# Patient Record
Sex: MALE | Race: BLACK OR AFRICAN AMERICAN | NOT HISPANIC OR LATINO | URBAN - METROPOLITAN AREA
[De-identification: names, ages, dates, MRNs, and addresses within clinical notes are randomized per-mention and may not be internally consistent; named-entity substitution may affect disease eponyms.]

---

## 2018-04-06 ENCOUNTER — INPATIENT (INPATIENT)
Facility: HOSPITAL | Age: 28
LOS: 0 days | Discharge: HOME | DRG: 343 | End: 2018-04-07
Attending: SURGERY | Admitting: SURGERY
Payer: COMMERCIAL

## 2018-04-06 VITALS
DIASTOLIC BLOOD PRESSURE: 73 MMHG | TEMPERATURE: 98 F | HEIGHT: 67 IN | OXYGEN SATURATION: 97 % | RESPIRATION RATE: 16 BRPM | WEIGHT: 167.99 LBS | HEART RATE: 82 BPM | SYSTOLIC BLOOD PRESSURE: 120 MMHG

## 2018-04-06 DIAGNOSIS — K35.80 UNSPECIFIED ACUTE APPENDICITIS: ICD-10-CM

## 2018-04-06 DIAGNOSIS — R10.9 UNSPECIFIED ABDOMINAL PAIN: ICD-10-CM

## 2018-04-06 LAB
ALBUMIN SERPL ELPH-MCNC: 4.8 G/DL — SIGNIFICANT CHANGE UP (ref 3.3–5)
ALP SERPL-CCNC: 47 U/L — SIGNIFICANT CHANGE UP (ref 40–120)
ALT FLD-CCNC: 23 U/L — SIGNIFICANT CHANGE UP (ref 10–45)
ANION GAP SERPL CALC-SCNC: 16 MMOL/L — SIGNIFICANT CHANGE UP (ref 5–17)
APTT BLD: 27 SEC — LOW (ref 27.5–37.4)
AST SERPL-CCNC: 21 U/L — SIGNIFICANT CHANGE UP (ref 10–40)
BILIRUB SERPL-MCNC: 2.3 MG/DL — HIGH (ref 0.2–1.2)
BLD GP AB SCN SERPL QL: NEGATIVE — SIGNIFICANT CHANGE UP
BUN SERPL-MCNC: 16 MG/DL — SIGNIFICANT CHANGE UP (ref 7–23)
CALCIUM SERPL-MCNC: 9.3 MG/DL — SIGNIFICANT CHANGE UP (ref 8.4–10.5)
CHLORIDE SERPL-SCNC: 95 MMOL/L — LOW (ref 96–108)
CO2 SERPL-SCNC: 22 MMOL/L — SIGNIFICANT CHANGE UP (ref 22–31)
CREAT SERPL-MCNC: 0.84 MG/DL — SIGNIFICANT CHANGE UP (ref 0.5–1.3)
EXTRA BLUE TOP TUBE: SIGNIFICANT CHANGE UP
GLUCOSE SERPL-MCNC: 129 MG/DL — HIGH (ref 70–99)
HCT VFR BLD CALC: 42 % — SIGNIFICANT CHANGE UP (ref 39–50)
HGB BLD-MCNC: 14.1 G/DL — SIGNIFICANT CHANGE UP (ref 13–17)
INR BLD: 1.31 — HIGH (ref 0.88–1.16)
LIDOCAIN IGE QN: 22 U/L — SIGNIFICANT CHANGE UP (ref 7–60)
LYMPHOCYTES # BLD AUTO: 6 % — LOW (ref 13–44)
MCHC RBC-ENTMCNC: 31.2 PG — SIGNIFICANT CHANGE UP (ref 27–34)
MCHC RBC-ENTMCNC: 33.6 G/DL — SIGNIFICANT CHANGE UP (ref 32–36)
MCV RBC AUTO: 92.9 FL — SIGNIFICANT CHANGE UP (ref 80–100)
MONOCYTES NFR BLD AUTO: 11 % — SIGNIFICANT CHANGE UP (ref 2–14)
NEUTROPHILS NFR BLD AUTO: 83 % — HIGH (ref 43–77)
PLATELET # BLD AUTO: 198 K/UL — SIGNIFICANT CHANGE UP (ref 150–400)
POTASSIUM SERPL-MCNC: 3.8 MMOL/L — SIGNIFICANT CHANGE UP (ref 3.5–5.3)
POTASSIUM SERPL-SCNC: 3.8 MMOL/L — SIGNIFICANT CHANGE UP (ref 3.5–5.3)
PROT SERPL-MCNC: 8.6 G/DL — HIGH (ref 6–8.3)
PROTHROM AB SERPL-ACNC: 14.6 SEC — HIGH (ref 9.8–12.7)
RBC # BLD: 4.52 M/UL — SIGNIFICANT CHANGE UP (ref 4.2–5.8)
RBC # FLD: 12.3 % — SIGNIFICANT CHANGE UP (ref 10.3–16.9)
RH IG SCN BLD-IMP: POSITIVE — SIGNIFICANT CHANGE UP
SODIUM SERPL-SCNC: 133 MMOL/L — LOW (ref 135–145)
WBC # BLD: 18.3 K/UL — HIGH (ref 3.8–10.5)
WBC # FLD AUTO: 18.3 K/UL — HIGH (ref 3.8–10.5)

## 2018-04-06 PROCEDURE — 99285 EMERGENCY DEPT VISIT HI MDM: CPT

## 2018-04-06 PROCEDURE — 74177 CT ABD & PELVIS W/CONTRAST: CPT | Mod: 26

## 2018-04-06 RX ORDER — OXYCODONE AND ACETAMINOPHEN 5; 325 MG/1; MG/1
1 TABLET ORAL EVERY 4 HOURS
Qty: 0 | Refills: 0 | Status: DISCONTINUED | OUTPATIENT
Start: 2018-04-06 | End: 2018-04-07

## 2018-04-06 RX ORDER — PIPERACILLIN AND TAZOBACTAM 4; .5 G/20ML; G/20ML
3.38 INJECTION, POWDER, LYOPHILIZED, FOR SOLUTION INTRAVENOUS EVERY 6 HOURS
Qty: 0 | Refills: 0 | Status: DISCONTINUED | OUTPATIENT
Start: 2018-04-06 | End: 2018-04-06

## 2018-04-06 RX ORDER — OXYCODONE AND ACETAMINOPHEN 5; 325 MG/1; MG/1
2 TABLET ORAL EVERY 4 HOURS
Qty: 0 | Refills: 0 | Status: DISCONTINUED | OUTPATIENT
Start: 2018-04-06 | End: 2018-04-07

## 2018-04-06 RX ORDER — CEFOXITIN 1 G/1
2 INJECTION, POWDER, FOR SOLUTION INTRAVENOUS ONCE
Qty: 0 | Refills: 0 | Status: COMPLETED | OUTPATIENT
Start: 2018-04-06 | End: 2018-04-06

## 2018-04-06 RX ORDER — HYDROMORPHONE HYDROCHLORIDE 2 MG/ML
0.5 INJECTION INTRAMUSCULAR; INTRAVENOUS; SUBCUTANEOUS EVERY 4 HOURS
Qty: 0 | Refills: 0 | Status: DISCONTINUED | OUTPATIENT
Start: 2018-04-06 | End: 2018-04-06

## 2018-04-06 RX ORDER — SODIUM CHLORIDE 9 MG/ML
1000 INJECTION, SOLUTION INTRAVENOUS
Qty: 0 | Refills: 0 | Status: DISCONTINUED | OUTPATIENT
Start: 2018-04-06 | End: 2018-04-07

## 2018-04-06 RX ORDER — ONDANSETRON 8 MG/1
4 TABLET, FILM COATED ORAL EVERY 6 HOURS
Qty: 0 | Refills: 0 | Status: DISCONTINUED | OUTPATIENT
Start: 2018-04-06 | End: 2018-04-07

## 2018-04-06 RX ORDER — CEFOTETAN DISODIUM 1 G
2 VIAL (EA) INJECTION ONCE
Qty: 0 | Refills: 0 | Status: DISCONTINUED | OUTPATIENT
Start: 2018-04-06 | End: 2018-04-06

## 2018-04-06 RX ORDER — HYDROMORPHONE HYDROCHLORIDE 2 MG/ML
1 INJECTION INTRAMUSCULAR; INTRAVENOUS; SUBCUTANEOUS EVERY 4 HOURS
Qty: 0 | Refills: 0 | Status: DISCONTINUED | OUTPATIENT
Start: 2018-04-06 | End: 2018-04-06

## 2018-04-06 RX ORDER — SODIUM CHLORIDE 9 MG/ML
1000 INJECTION INTRAMUSCULAR; INTRAVENOUS; SUBCUTANEOUS ONCE
Qty: 0 | Refills: 0 | Status: COMPLETED | OUTPATIENT
Start: 2018-04-06 | End: 2018-04-06

## 2018-04-06 RX ORDER — IOHEXOL 300 MG/ML
50 INJECTION, SOLUTION INTRAVENOUS ONCE
Qty: 0 | Refills: 0 | Status: COMPLETED | OUTPATIENT
Start: 2018-04-06 | End: 2018-04-06

## 2018-04-06 RX ADMIN — CEFOXITIN 100 GRAM(S): 1 INJECTION, POWDER, FOR SOLUTION INTRAVENOUS at 13:08

## 2018-04-06 RX ADMIN — IOHEXOL 50 MILLILITER(S): 300 INJECTION, SOLUTION INTRAVENOUS at 11:00

## 2018-04-06 RX ADMIN — SODIUM CHLORIDE 1000 MILLILITER(S): 9 INJECTION INTRAMUSCULAR; INTRAVENOUS; SUBCUTANEOUS at 11:04

## 2018-04-06 NOTE — H&P ADULT - ASSESSMENT
27yoM otherwise healthy presents to the ED with 1-day of abdominal pain and emesis likely due to acute appendicitis. non     Admit to General Surgery Team 4/ACS regional  NPO  IVF  IV cefoxitin  SCDs  OOBA/IS  Added on to OR schedule for today 27yoM otherwise healthy presents to the ED with 1-day of abdominal pain and emesis likely due to acute appendicitis, non perforated    Admit to General Surgery Team 4/ACS regional  NPO  IVF  IV cefoxitin  SCDs  OOBA/IS  Added on to OR schedule for today  Discussed with chief and attending

## 2018-04-06 NOTE — ED PROVIDER NOTE - OBJECTIVE STATEMENT
28 y/o m no pmh presents stating he has been having mid abdominal pain for the past day.  Pt stating pain has been constant, sharp.  Pt reporting nausea, no vomiting.  Went to [facility] this morning and was sent to ED to rule out appendicitis.  Pt denies diarrhea, fever, dysuria, all other ROS negative.

## 2018-04-06 NOTE — ED ADULT TRIAGE NOTE - ARRIVAL INFO ADDITIONAL COMMENTS
"I was sent from [facility] MD for a rule out appendicitis." Patient with periumbilical pain since yesterday that began acutely. No nausea. Denies any trauma. No heavy lifting. Last BM was yesterday. States normal. Denies any dysuria.

## 2018-04-06 NOTE — H&P ADULT - NSHPLABSRESULTS_GEN_ALL_CORE
14.1   18.3  )-----------( 198      ( 06 Apr 2018 10:11 )             42.0   04-06    133<L>  |  95<L>  |  16  ----------------------------<  129<H>  3.8   |  22  |  0.84    Ca    9.3      06 Apr 2018 10:11    TPro  8.6<H>  /  Alb  4.8  /  TBili  2.3<H>  /  DBili  x   /  AST  21  /  ALT  23  /  AlkPhos  47  04-06  < from: CT Abdomen and Pelvis w/ Oral Cont and w/ IV Cont (04.06.18 @ 12:14) >    There are the few scattered too small to characterize hypodensities   throughout the liver and bilateral kidneys. The gallbladder is   underdistended. The liver, spleen, pancreas, adrenal glands and both   kidneys are otherwise unremarkable in appearance. There is no   organomegaly.    No abdominal aortic aneurysm is seen. No retroperitoneal mass or   lymphadenopathy is seen.     The appendix is dilated and fluid-filled with hyperemia and   periappendiceal stranding, and is centered within the pelvis, consistent   with appendicitis. There is no evidence of rupture or periappendiceal   abscess. There is mild circumferential wall thickening of the adjacent   sigmoid colon which is likely reactive. There are no dilated loops of   bowel or air-fluid levels. No large amount of ascites or pneumoperitoneum.    Urinary bladder is underdistended and appears grossly unremarkable. There   is no pelvic mass or lymphadenopathy.    No aggressive osseous lesion is seen. A mild broad levoconvex curvature   of the lumbar spine is noted.    IMPRESSION:  Findings consistent with acute appendicitis, without evidence of   perforation or periappendiceal abscess formation.      < end of copied text >

## 2018-04-06 NOTE — ED ADULT NURSE NOTE - CHPI ED SYMPTOMS NEG
no chills/no vomiting/no hematuria/no diarrhea/no dysuria/no blood in stool/no burning urination/no fever

## 2018-04-06 NOTE — ED ADULT NURSE NOTE - OBJECTIVE STATEMENT
Pt w no PMH/PSH referred from [facility] w abdom pain to r/o appendicitis. Pt w no PMH/PSH referred from [facility] w sharp constant abdom pain to r/o appendicitis. Pt endorses nausea w/out vomiting, denies diarrhea, fever or dysuria.

## 2018-04-06 NOTE — ED PROVIDER NOTE - MEDICAL DECISION MAKING DETAILS
28 y/o m RLQ pain x 1 day; tender on exam, wbc 18.3, will get CT a/p for further eval, likely appendicitis.

## 2018-04-06 NOTE — ED PROVIDER NOTE - ATTENDING CONTRIBUTION TO CARE
26 y/o m with rlq abd pain x 1 day, pe - reproducible pain rlq, wbc count of 18, ct done to r/o appendicitis, dispo as per ct - 28 y/o m with rlq abd pain x 1 day, pe - reproducible pain rlq, wbc count of 18, ct done to r/o appendicitis, dispo as per ct - ct with + appendicitis, surgical consultation and antibiotics as per surgery.

## 2018-04-06 NOTE — BRIEF OPERATIVE NOTE - PROCEDURE
<<-----Click on this checkbox to enter Procedure Laparoscopic appendectomy  04/06/2018    Active  [email or web address]

## 2018-04-06 NOTE — H&P ADULT - HISTORY OF PRESENT ILLNESS
27yoF with no PMH/PSH presents to the ED with one-day history of abdominal pain and emesis. He began having constant pain yesterday focused in the periumbilical area, associated with NBNB emesis. Pain increased in severity so pt presented to the ED. Here he reports pain is mildly improved with medication, though persistent. No further nausea or emesis since yesterday. No fevers, though reports chills. No change in bowel habits. No history of IBS.    Takes no medications.

## 2018-04-06 NOTE — H&P ADULT - NSHPPHYSICALEXAM_GEN_ALL_CORE
Gen: NAD, resting comfortably in chair. Well developed, well groomed, appears stated age  Neuro: CNII-XII grossly intact. AAOx3. Follows commands  HEENT: PERRL, EOMI, MMM  Pulm: CTAB, no respiratory distress, nonlabored breathing on RA  C/V: RRR, no MRG  Abd: Soft, ND, moderately tender to palpation in infraumbilical region and RLQ, infraumbilical>RLQ.  No surgical scars. No tympany, no rebound, +voluntary guarding.  Extrem: WWP, no edema, nontender. No cyanosis, pallor. Palpable radial, PT bilaterally  Skin: No rashes noted  Psych: normal affect, responds appropriately to questions

## 2018-04-07 VITALS
DIASTOLIC BLOOD PRESSURE: 72 MMHG | HEART RATE: 84 BPM | OXYGEN SATURATION: 100 % | SYSTOLIC BLOOD PRESSURE: 120 MMHG | TEMPERATURE: 97 F | RESPIRATION RATE: 16 BRPM

## 2018-04-07 RX ORDER — INFLUENZA VIRUS VACCINE 15; 15; 15; 15 UG/.5ML; UG/.5ML; UG/.5ML; UG/.5ML
0.5 SUSPENSION INTRAMUSCULAR ONCE
Qty: 0 | Refills: 0 | Status: DISCONTINUED | OUTPATIENT
Start: 2018-04-07 | End: 2018-04-07

## 2018-04-07 NOTE — DISCHARGE NOTE ADULT - HOSPITAL COURSE
27yoF with no PMH/PSH presents to the ED with one-day history of abdominal pain and emesis. He began having constant pain yesterday focused in the periumbilical area, associated with NBNB emesis. Pain increased in severity so pt presented to the ED. Here he reports pain is mildly improved with medication, though persistent. No further nausea or emesis since yesterday. No fevers, though reports chills. No change in bowel habits. No history of IBS.    During hospital course, patient underwent uncomplicated lap appendectomy. Patient had ROBF and tolerated advancement of diet. Patient's clinical condition improved during hospital stay. Patient was hemodynamically stable, afebrile, and with a plan for followup upon discharge.

## 2018-04-07 NOTE — DISCHARGE NOTE ADULT - PLAN OF CARE
Recovery Follow up with Dr. Corea in 1-2 weeks. Call the office to schedule your appointment. You may shower; soap and water over incision sites. Do not scrub. Pat dry when done. No tub bathing or swimming until cleared. Keep incision sites out of the sun as scars will darken. Ambulate as tolerated, but no heavy lifting (>10lbs) or strenuous exercise. You may resume regular diet. You should be urinating at least 3-4x per day. Call the office if you experience increasing pain, abdominal pain, nausea, vomiting, or temperature >101.4F.

## 2018-04-07 NOTE — DISCHARGE NOTE ADULT - CARE PROVIDER_API CALL
Eddie Corea), ColonRectal Surgery; Surgery  [address]  [address]  [place] [address]  Phone: [phone]  Fax: [phone]

## 2018-04-07 NOTE — DISCHARGE NOTE ADULT - MEDICATION SUMMARY - MEDICATIONS TO TAKE
I will START or STAY ON the medications listed below when I get home from the hospital:  None I will START or STAY ON the medications listed below when I get home from the hospital:    Percocet 5/325 oral tablet  -- 1 tab(s) by mouth every 6 hours, As Needed -for severe pain MDD:4   -- Caution federal law prohibits the transfer of this drug to any person other  than the person for whom it was prescribed.  May cause drowsiness.  Alcohol may intensify this effect.  Use care when operating dangerous machinery.  This prescription cannot be refilled.  This product contains acetaminophen.  Do not use  with any other product containing acetaminophen to prevent possible liver damage.  Using more of this medication than prescribed may cause serious breathing problems.    -- Indication: For pain

## 2018-04-07 NOTE — DISCHARGE NOTE ADULT - CARE PLAN
Principal Discharge DX:	Acute appendicitis, unspecified acute appendicitis type  Goal:	Recovery  Assessment and plan of treatment:	Follow up with Dr. Corea in 1-2 weeks. Call the office to schedule your appointment. You may shower; soap and water over incision sites. Do not scrub. Pat dry when done. No tub bathing or swimming until cleared. Keep incision sites out of the sun as scars will darken. Ambulate as tolerated, but no heavy lifting (>10lbs) or strenuous exercise. You may resume regular diet. You should be urinating at least 3-4x per day. Call the office if you experience increasing pain, abdominal pain, nausea, vomiting, or temperature >101.4F.

## 2018-04-07 NOTE — DISCHARGE NOTE ADULT - PATIENT PORTAL LINK FT
You can access the [facility] Patient Portal, offered by [facility], by registering with the following website: [email or web address]

## 2018-04-10 PROCEDURE — 88302 TISSUE EXAM BY PATHOLOGIST: CPT

## 2018-04-10 PROCEDURE — 99285 EMERGENCY DEPT VISIT HI MDM: CPT | Mod: 25

## 2018-04-10 PROCEDURE — 83690 ASSAY OF LIPASE: CPT

## 2018-04-10 PROCEDURE — 36415 COLL VENOUS BLD VENIPUNCTURE: CPT

## 2018-04-10 PROCEDURE — 96374 THER/PROPH/DIAG INJ IV PUSH: CPT | Mod: XU

## 2018-04-10 PROCEDURE — 85610 PROTHROMBIN TIME: CPT

## 2018-04-10 PROCEDURE — 86900 BLOOD TYPING SEROLOGIC ABO: CPT

## 2018-04-10 PROCEDURE — 85025 COMPLETE CBC W/AUTO DIFF WBC: CPT

## 2018-04-10 PROCEDURE — 80053 COMPREHEN METABOLIC PANEL: CPT

## 2018-04-10 PROCEDURE — 86850 RBC ANTIBODY SCREEN: CPT

## 2018-04-10 PROCEDURE — 86901 BLOOD TYPING SEROLOGIC RH(D): CPT

## 2018-04-10 PROCEDURE — 85730 THROMBOPLASTIN TIME PARTIAL: CPT

## 2018-04-10 PROCEDURE — 74177 CT ABD & PELVIS W/CONTRAST: CPT

## 2018-04-11 LAB — SURGICAL PATHOLOGY STUDY: SIGNIFICANT CHANGE UP

## 2018-05-03 NOTE — PATIENT PROFILE ADULT. - VISION (WITH CORRECTIVE LENSES IF THE PATIENT USUALLY WEARS THEM):
The patient is a 78y Male complaining of leg swelling
Normal vision: sees adequately in most situations; can see medication labels, newsprint

## 2018-08-03 NOTE — ED ADULT NURSE NOTE - PRIMARY CARE PROVIDER
I have reviewed discharge instructions with the patient. The patient verbalized understanding. Patient armband removed and shredded. Pt d/c'd to home awake, alert and in NAD. All questions answered. none

## 2019-03-28 NOTE — PATIENT PROFILE ADULT. - TEACHING/LEARNING FACTORS INFLUENCE READINESS TO LEARN
PRE-SEDATION ASSESSMENT    CONSENT  Consent for procedure and sedation obtained: Yes    MEDICAL HISTORY  Significant medical/surgical history: Yes  Past Complications with Sedation/Anesthesia: No  Smoking History: No  Alcohol/Drug abuse: No  Possible Pregnancy (LMP): Not Applicable  Cardiac History: Yes  Respiratory History: No    PHYSICAL EXAM  History and Physical Reviewed: Patient has valid H&P within 30 days. I have reviewed and changes are noted below.  H&P Updates: patient having hypertension and headache today but no chest pain.   Airway Anatomy : Class II  Heart : Normal  Lungs : Normal  LOC/Mental Status : Normal    OTHER FINDINGS  Reviewed current medications and allergies: Yes  Pertinent lab/diagnostic test reviewed: Yes    SEDATION RISK ASSESSMENT  Risk Status ASA: Class II - Normal patient with mild systemic disease  Plan for Sedation: Moderate Sedation  Indications for Procedure/Pre-Procedure Diagnosis and Planned Procedure: reduce anxiety  EKG Monitoring: Yes    NARRATIVE FINDINGS     
none
